# Patient Record
Sex: FEMALE
[De-identification: names, ages, dates, MRNs, and addresses within clinical notes are randomized per-mention and may not be internally consistent; named-entity substitution may affect disease eponyms.]

---

## 2024-04-26 PROBLEM — Z00.00 ENCOUNTER FOR PREVENTIVE HEALTH EXAMINATION: Status: ACTIVE | Noted: 2024-04-26

## 2024-04-30 ENCOUNTER — APPOINTMENT (OUTPATIENT)
Dept: ANTEPARTUM | Facility: CLINIC | Age: 34
End: 2024-04-30
Payer: COMMERCIAL

## 2024-04-30 ENCOUNTER — TRANSCRIPTION ENCOUNTER (OUTPATIENT)
Age: 34
End: 2024-04-30

## 2024-04-30 ENCOUNTER — ASOB RESULT (OUTPATIENT)
Age: 34
End: 2024-04-30

## 2024-04-30 PROCEDURE — 76805 OB US >/= 14 WKS SNGL FETUS: CPT

## 2024-04-30 PROCEDURE — 76817 TRANSVAGINAL US OBSTETRIC: CPT

## 2024-05-28 ENCOUNTER — ASOB RESULT (OUTPATIENT)
Age: 34
End: 2024-05-28

## 2024-05-28 ENCOUNTER — APPOINTMENT (OUTPATIENT)
Dept: ANTEPARTUM | Facility: CLINIC | Age: 34
End: 2024-05-28
Payer: COMMERCIAL

## 2024-05-28 PROCEDURE — 76817 TRANSVAGINAL US OBSTETRIC: CPT

## 2024-05-28 PROCEDURE — 76811 OB US DETAILED SNGL FETUS: CPT

## 2024-06-14 ENCOUNTER — ASOB RESULT (OUTPATIENT)
Age: 34
End: 2024-06-14

## 2024-06-14 ENCOUNTER — APPOINTMENT (OUTPATIENT)
Dept: ANTEPARTUM | Facility: CLINIC | Age: 34
End: 2024-06-14
Payer: COMMERCIAL

## 2024-06-14 PROCEDURE — 76816 OB US FOLLOW-UP PER FETUS: CPT

## 2024-07-26 ENCOUNTER — APPOINTMENT (OUTPATIENT)
Dept: ANTEPARTUM | Facility: CLINIC | Age: 34
End: 2024-07-26
Payer: COMMERCIAL

## 2024-07-26 ENCOUNTER — ASOB RESULT (OUTPATIENT)
Age: 34
End: 2024-07-26

## 2024-07-26 PROCEDURE — 76816 OB US FOLLOW-UP PER FETUS: CPT

## 2024-07-26 PROCEDURE — 76817 TRANSVAGINAL US OBSTETRIC: CPT

## 2024-07-26 PROCEDURE — 76819 FETAL BIOPHYS PROFIL W/O NST: CPT

## 2024-07-26 PROCEDURE — 76820 UMBILICAL ARTERY ECHO: CPT | Mod: 59

## 2024-08-23 ENCOUNTER — APPOINTMENT (OUTPATIENT)
Dept: ANTEPARTUM | Facility: CLINIC | Age: 34
End: 2024-08-23
Payer: COMMERCIAL

## 2024-08-23 ENCOUNTER — ASOB RESULT (OUTPATIENT)
Age: 34
End: 2024-08-23

## 2024-08-23 PROCEDURE — 76819 FETAL BIOPHYS PROFIL W/O NST: CPT | Mod: 59

## 2024-08-23 PROCEDURE — 76816 OB US FOLLOW-UP PER FETUS: CPT

## 2024-08-23 PROCEDURE — 76820 UMBILICAL ARTERY ECHO: CPT | Mod: 59

## 2024-08-23 PROCEDURE — 76817 TRANSVAGINAL US OBSTETRIC: CPT

## 2024-09-30 ENCOUNTER — OUTPATIENT (OUTPATIENT)
Dept: OUTPATIENT SERVICES | Facility: HOSPITAL | Age: 34
LOS: 1 days | End: 2024-09-30
Payer: COMMERCIAL

## 2024-09-30 VITALS
SYSTOLIC BLOOD PRESSURE: 137 MMHG | TEMPERATURE: 98 F | HEART RATE: 114 BPM | RESPIRATION RATE: 18 BRPM | DIASTOLIC BLOOD PRESSURE: 78 MMHG | OXYGEN SATURATION: 97 %

## 2024-09-30 DIAGNOSIS — O26.899 OTHER SPECIFIED PREGNANCY RELATED CONDITIONS, UNSPECIFIED TRIMESTER: ICD-10-CM

## 2024-09-30 PROCEDURE — 99214 OFFICE O/P EST MOD 30 MIN: CPT

## 2024-09-30 NOTE — OB PROVIDER TRIAGE NOTE - HISTORY OF PRESENT ILLNESS
Patient is a 34 y.o.  @39w2d who presents with ctx since 3pm. Pt states the ctx were 5-10 minutes apart earlier however since arriving to triage she feels that they have spaced out. States they are ~3/10 in severity. Denies LOF or VB. Endorses fetal movement. Last VE in the office 2-3 weeks ago, pt was told she was closed.     Spontaneous pregnancy, uncomplicated. NIPT and anatomy scan WNL. Passed GCT. Denies elevated BPs in the pregnancy. EFW [ ].  GBS negative.   [Date]:     Ob hx: Denies  Gyn hx: Denies  PMH: Denies  Meds: PNV  PSH: Denies  Allergies: Denies    Physical Exam  BP  137/78 (24 @ 17:16)    Gen: Well-appearing. NAD.  Resp: Breathing comfortably on RA  Abd: Gravid uterus. Soft, non-tender, non-distended.    SSE:  VE: _    TAUS: _presentation. _placenta. BPP _/8, PALMA _.  TVUS:     FHT: Baseline _, moderate variability, +accels, -decels  [place]: Ctx_    A/P  34yyo G[ ]P[ ] @[ ]w[ ]d presenting [ ].     -     Li Benz, PGY1  Discussed with  Patient is a 34 y.o.  @39w2d who presents with ctx since 3pm. Pt states the ctx were 5-10 minutes apart earlier however since arriving to triage she feels that they have spaced out. States they are ~3/10 in severity. Denies LOF or VB. Endorses fetal movement. Last VE in the office 2-3 weeks ago, pt was told she was closed.     Spontaneous pregnancy, uncomplicated. NIPT and anatomy scan WNL. Passed GCT. Denies elevated BPs in the pregnancy. EFW 3400g.  GBS negative.  BH 24: cephalic presentation, anterior placenta, PALMA 17.0, BPP 8/8, EFW 2329g, 48%ile, AC 50%ile; Hx of Low lying placenta, now resolved; hx of subchorionic hematoma, now resolved    Ob hx:   G1-  , IOL for decreased FM. c/b PPH, received 1U pRBCs. 3175g  Gyn hx: Denies ovarian cysts, fibroids, abn pap, STIs/HSV  PMH: Denies  Meds: PNV  PSH: Denies  Allergies: Denies    Physical Exam  BP  137/78 (24 @ 17:16)    Gen: Well-appearing. NAD.  Resp: Breathing comfortably on RA  Abd: Gravid uterus. Soft, non-tender, non-distended.    VE: 1-2/long  TAUS: cephalic presentation. anterior placenta. BPP 8/8, PALMA 17.8.      FHT: Baseline 120, moderate variability, +accels, -decels; reactive and reassuring  [place]: no discrete ctx on toco; +uterine irritability    A/P  34 y.o.  @39w2d presenting for r/o labor.  -Pt with ctx since 3pm, however now spacing out. No discrete ctx on toco and patient endorsing 1-2 ctx throughout entire triage evaluation. VE 1-2/long. [place]    -     Li Benz, PGY1  Discussed with  Patient is a 34 y.o.  @39w2d who presents with ctx since 3pm. Pt states the ctx were 5-10 minutes apart earlier however since arriving to triage she feels that they have spaced out. States they are ~3/10 in severity. Denies LOF or VB. Endorses fetal movement. Last VE in the office 2-3 weeks ago, pt was told she was closed.     Spontaneous pregnancy, uncomplicated. NIPT and anatomy scan WNL. Passed GCT. Denies elevated BPs in the pregnancy. EFW 3400g.  GBS negative.  BH 24: cephalic presentation, anterior placenta, APLMA 17.0, BPP 8/8, EFW 2329g, 48%ile, AC 50%ile; Hx of Low lying placenta, now resolved; hx of subchorionic hematoma, now resolved    Ob hx:   G1-  , IOL for decreased FM. c/b PPH, received 1U pRBCs. 3175g  Gyn hx: Denies ovarian cysts, fibroids, abn pap, STIs/HSV  PMH: Denies  Meds: PNV  PSH: Denies  Allergies: Denies    Physical Exam  BP  137/78 (24 @ 17:16)    Gen: Well-appearing. NAD.  Resp: Breathing comfortably on RA  Abd: Gravid uterus. Soft, non-tender, non-distended.    VE: 1-2/long  TAUS: cephalic presentation. anterior placenta. BPP 8/8, PALMA 17.8.      FHT: Baseline 120, moderate variability, +accels, -decels; reactive and reassuring  [place]: no discrete ctx on toco; +uterine irritability    A/P  34 y.o.  @39w2d presenting for r/o labor.  -Pt with ctx since 3pm, however now spacing out per patient and no discrete ctx captured on toco. VE 1-2/long. Low concern for labor at this time given the above evaluation.   -Counseled patient on strict return precautions including intensifying ctx, need for pain management, leakage of fluid, vaginal bleeding, or decreased fetal movement. Pt endorses understanding.  -Fetal status reassuring given BPP 8/8 and NST reactive and reassuring.  -Pt has appointment scheduled with outpatient OBGYN for this week  -IOL scheduled for Saturday per patient    Li Benz, PGY1  Discussed with Dr. Oliva and Dr. Liu, PGY3

## 2024-09-30 NOTE — OB RN TRIAGE NOTE - PATIENT'S GENDER IDENTITY
Keep up the good work on diet & water.    Consider 1/2 - 1 dose of miralax - always take a full dose if you are taking an ibuprofen.     For those with a tendency towards constipation, avoid TUMS type antacids, & instead take an antacid with magnesium such as maalox, mylanta, gaviscon - liquid is best.    If you must use chewable be sure to chew it up well to provide for the maximum surface area to absorb the most acid.       Female

## 2024-10-03 DIAGNOSIS — Z3A.39 39 WEEKS GESTATION OF PREGNANCY: ICD-10-CM

## 2024-10-03 DIAGNOSIS — O47.1 FALSE LABOR AT OR AFTER 37 COMPLETED WEEKS OF GESTATION: ICD-10-CM

## 2024-10-05 ENCOUNTER — INPATIENT (INPATIENT)
Facility: HOSPITAL | Age: 34
LOS: 1 days | Discharge: HOME | End: 2024-10-07
Attending: STUDENT IN AN ORGANIZED HEALTH CARE EDUCATION/TRAINING PROGRAM | Admitting: STUDENT IN AN ORGANIZED HEALTH CARE EDUCATION/TRAINING PROGRAM
Payer: COMMERCIAL

## 2024-10-05 VITALS
OXYGEN SATURATION: 100 % | TEMPERATURE: 98 F | HEART RATE: 100 BPM | SYSTOLIC BLOOD PRESSURE: 117 MMHG | HEIGHT: 64 IN | DIASTOLIC BLOOD PRESSURE: 71 MMHG | RESPIRATION RATE: 18 BRPM | WEIGHT: 199.96 LBS

## 2024-10-05 LAB
BASOPHILS # BLD AUTO: 0.07 K/UL — SIGNIFICANT CHANGE UP (ref 0–0.2)
BASOPHILS NFR BLD AUTO: 0.6 % — SIGNIFICANT CHANGE UP (ref 0–2)
BLD GP AB SCN SERPL QL: NEGATIVE — SIGNIFICANT CHANGE UP
EOSINOPHIL # BLD AUTO: 0.12 K/UL — SIGNIFICANT CHANGE UP (ref 0–0.5)
EOSINOPHIL NFR BLD AUTO: 1 % — SIGNIFICANT CHANGE UP (ref 0–6)
HCT VFR BLD CALC: 34.5 % — SIGNIFICANT CHANGE UP (ref 34.5–45)
HGB BLD-MCNC: 11.3 G/DL — LOW (ref 11.5–15.5)
IMM GRANULOCYTES NFR BLD AUTO: 1.4 % — HIGH (ref 0–0.9)
LYMPHOCYTES # BLD AUTO: 2.88 K/UL — SIGNIFICANT CHANGE UP (ref 1–3.3)
LYMPHOCYTES # BLD AUTO: 24 % — SIGNIFICANT CHANGE UP (ref 13–44)
MCHC RBC-ENTMCNC: 29 PG — SIGNIFICANT CHANGE UP (ref 27–34)
MCHC RBC-ENTMCNC: 32.8 GM/DL — SIGNIFICANT CHANGE UP (ref 32–36)
MCV RBC AUTO: 88.7 FL — SIGNIFICANT CHANGE UP (ref 80–100)
MONOCYTES # BLD AUTO: 0.99 K/UL — HIGH (ref 0–0.9)
MONOCYTES NFR BLD AUTO: 8.3 % — SIGNIFICANT CHANGE UP (ref 2–14)
NEUTROPHILS # BLD AUTO: 7.76 K/UL — HIGH (ref 1.8–7.4)
NEUTROPHILS NFR BLD AUTO: 64.7 % — SIGNIFICANT CHANGE UP (ref 43–77)
NRBC # BLD: 0 /100 WBCS — SIGNIFICANT CHANGE UP (ref 0–0)
PLATELET # BLD AUTO: 197 K/UL — SIGNIFICANT CHANGE UP (ref 150–400)
RBC # BLD: 3.89 M/UL — SIGNIFICANT CHANGE UP (ref 3.8–5.2)
RBC # FLD: 14.2 % — SIGNIFICANT CHANGE UP (ref 10.3–14.5)
RH IG SCN BLD-IMP: POSITIVE — SIGNIFICANT CHANGE UP
RH IG SCN BLD-IMP: POSITIVE — SIGNIFICANT CHANGE UP
T PALLIDUM AB TITR SER: NEGATIVE — SIGNIFICANT CHANGE UP
WBC # BLD: 11.99 K/UL — HIGH (ref 3.8–10.5)
WBC # FLD AUTO: 11.99 K/UL — HIGH (ref 3.8–10.5)

## 2024-10-05 RX ORDER — OXYTOCIN/RINGER'S LACTATE 20/500ML
167 PLASTIC BAG, INJECTION (ML) INTRAVENOUS
Qty: 30 | Refills: 0 | Status: DISCONTINUED | OUTPATIENT
Start: 2024-10-05 | End: 2024-10-07

## 2024-10-05 RX ORDER — DIPHENHYDRAMINE HCL 12.5MG/5ML
25 LIQUID (ML) ORAL EVERY 6 HOURS
Refills: 0 | Status: DISCONTINUED | OUTPATIENT
Start: 2024-10-05 | End: 2024-10-07

## 2024-10-05 RX ORDER — CEFAZOLIN SODIUM 1 G
2000 VIAL (EA) INJECTION ONCE
Refills: 0 | Status: COMPLETED | OUTPATIENT
Start: 2024-10-05 | End: 2024-10-05

## 2024-10-05 RX ORDER — OXYTOCIN/RINGER'S LACTATE 20/500ML
PLASTIC BAG, INJECTION (ML) INTRAVENOUS
Qty: 30 | Refills: 0 | Status: DISCONTINUED | OUTPATIENT
Start: 2024-10-05 | End: 2024-10-05

## 2024-10-05 RX ORDER — TETANUS TOXOID, REDUCED DIPHTHERIA TOXOID AND ACELLULAR PERTUSSIS VACCINE, ADSORBED 5; 2.5; 8; 8; 2.5 [IU]/.5ML; [IU]/.5ML; UG/.5ML; UG/.5ML; UG/.5ML
0.5 SUSPENSION INTRAMUSCULAR ONCE
Refills: 0 | Status: DISCONTINUED | OUTPATIENT
Start: 2024-10-05 | End: 2024-10-07

## 2024-10-05 RX ORDER — SODIUM CHLORIDE 0.9 % (FLUSH) 0.9 %
3 SYRINGE (ML) INJECTION EVERY 8 HOURS
Refills: 0 | Status: DISCONTINUED | OUTPATIENT
Start: 2024-10-05 | End: 2024-10-07

## 2024-10-05 RX ORDER — KETOROLAC TROMETHAMINE 10 MG/1
30 TABLET, FILM COATED ORAL ONCE
Refills: 0 | Status: DISCONTINUED | OUTPATIENT
Start: 2024-10-05 | End: 2024-10-05

## 2024-10-05 RX ORDER — HEPARIN SOD,PORK IN 0.45% NACL 5K/1000 ML
250 INTRAVENOUS SOLUTION INTRAVENOUS
Refills: 0 | Status: DISCONTINUED | OUTPATIENT
Start: 2024-10-05 | End: 2024-10-05

## 2024-10-05 RX ORDER — SODIUM CHLORIDE IRRIG SOLUTION 0.9 %
1000 SOLUTION, IRRIGATION IRRIGATION
Refills: 0 | Status: DISCONTINUED | OUTPATIENT
Start: 2024-10-05 | End: 2024-10-05

## 2024-10-05 RX ORDER — MAGNESIUM HYDROXIDE 400 MG/5ML
30 SUSPENSION, ORAL (FINAL DOSE FORM) ORAL
Refills: 0 | Status: DISCONTINUED | OUTPATIENT
Start: 2024-10-05 | End: 2024-10-07

## 2024-10-05 RX ORDER — SOAP/LANOLIN
1 BAR TOPICAL EVERY 4 HOURS
Refills: 0 | Status: DISCONTINUED | OUTPATIENT
Start: 2024-10-05 | End: 2024-10-07

## 2024-10-05 RX ORDER — OXYCODONE HYDROCHLORIDE 30 MG/1
5 TABLET, FILM COATED, EXTENDED RELEASE ORAL
Refills: 0 | Status: DISCONTINUED | OUTPATIENT
Start: 2024-10-05 | End: 2024-10-07

## 2024-10-05 RX ORDER — DIBUCAINE 1 %
1 OINTMENT (GRAM) TOPICAL EVERY 6 HOURS
Refills: 0 | Status: DISCONTINUED | OUTPATIENT
Start: 2024-10-05 | End: 2024-10-07

## 2024-10-05 RX ORDER — OXYCODONE HYDROCHLORIDE 30 MG/1
5 TABLET, FILM COATED, EXTENDED RELEASE ORAL ONCE
Refills: 0 | Status: DISCONTINUED | OUTPATIENT
Start: 2024-10-05 | End: 2024-10-07

## 2024-10-05 RX ORDER — ANTI-ITCH CREAM 1 G/100G
1 OINTMENT TOPICAL EVERY 6 HOURS
Refills: 0 | Status: DISCONTINUED | OUTPATIENT
Start: 2024-10-05 | End: 2024-10-07

## 2024-10-05 RX ORDER — CHLORHEXIDINE GLUCONATE ORAL RINSE 1.2 MG/ML
1 SOLUTION DENTAL DAILY
Refills: 0 | Status: DISCONTINUED | OUTPATIENT
Start: 2024-10-05 | End: 2024-10-05

## 2024-10-05 RX ORDER — PRAMOXINE HYDROCHLORIDE 10 MG/ML
1 LOTION TOPICAL EVERY 4 HOURS
Refills: 0 | Status: DISCONTINUED | OUTPATIENT
Start: 2024-10-05 | End: 2024-10-07

## 2024-10-05 RX ORDER — OXYTOCIN/RINGER'S LACTATE 20/500ML
167 PLASTIC BAG, INJECTION (ML) INTRAVENOUS
Qty: 30 | Refills: 0 | Status: DISCONTINUED | OUTPATIENT
Start: 2024-10-05 | End: 2024-10-05

## 2024-10-05 RX ORDER — SODIUM CITRATE AND CITRIC ACID MONOHYDRATE 334; 500 MG/5ML; MG/5ML
15 SOLUTION ORAL EVERY 6 HOURS
Refills: 0 | Status: DISCONTINUED | OUTPATIENT
Start: 2024-10-05 | End: 2024-10-05

## 2024-10-05 RX ORDER — PRENATAL VIT,CAL 76/IRON/FOLIC 29 MG-1 MG
1 TABLET ORAL DAILY
Refills: 0 | Status: DISCONTINUED | OUTPATIENT
Start: 2024-10-05 | End: 2024-10-07

## 2024-10-05 RX ORDER — ACETAMINOPHEN 325 MG
975 TABLET ORAL
Refills: 0 | Status: DISCONTINUED | OUTPATIENT
Start: 2024-10-05 | End: 2024-10-07

## 2024-10-05 RX ADMIN — Medication 600 MILLIGRAM(S): at 23:44

## 2024-10-05 RX ADMIN — Medication 220 MILLIGRAM(S): at 16:30

## 2024-10-05 RX ADMIN — Medication 2 MILLIUNIT(S)/MIN: at 08:39

## 2024-10-05 RX ADMIN — Medication 100 MILLIGRAM(S): at 17:10

## 2024-10-05 RX ADMIN — KETOROLAC TROMETHAMINE 30 MILLIGRAM(S): 10 TABLET, FILM COATED ORAL at 18:50

## 2024-10-05 RX ADMIN — Medication 975 MILLIGRAM(S): at 21:15

## 2024-10-05 RX ADMIN — Medication 975 MILLIGRAM(S): at 22:00

## 2024-10-05 NOTE — OB PROVIDER LABOR PROGRESS NOTE - NS_SUBJECTIVE/OBJECTIVE_OBGYN_ALL_OB_FT
EFM reviewed. Baseline 120, moderate variability, no accelerations, no decelerations. Category I tracing.   Ctx q2min.   Comfortable with epidural. S/p cervical balloon and arom 1415, clear. Last VE 3-4/80/-3. Will continue to monitor closely.   Dr. Fontaine in house.

## 2024-10-05 NOTE — OB PROVIDER LABOR PROGRESS NOTE - NS_SUBJECTIVE/OBJECTIVE_OBGYN_ALL_OB_FT
EFM reviewed. Baseline 125, moderate variability, +accelerations, no decelerations. Category I tracing.   Ctx q2min.     Pitocin at 6, will continue to increase as tolerated.   Pt to receive epidural shortly.

## 2024-10-05 NOTE — OB PROVIDER DELIVERY SUMMARY - NSLOWPPHRISK_OBGYN_A_OB
No previous uterine incision/[place] Pregnancy/Less than or equal to 4 previous vaginal births/No known bleeding disorder/No history of postpartum hemorrhage/No other PPH risks indicated

## 2024-10-05 NOTE — OB PROVIDER DELIVERY SUMMARY - NSSELHIDDEN_OBGYN_ALL_OB_FT
[NS_DeliveryAttending1_OBGYN_ALL_OB_FT:[id number]=],[NS_DeliveryAssist1_OBGYN_ALL_OB_FT:[id number]=],[NS_DeliveryRN_OBGYN_ALL_OB_FT:[id number]=],[NS_CirculateRN2_OBGYN_ALL_OB_FT:[id number]=]

## 2024-10-05 NOTE — OB PROVIDER LABOR PROGRESS NOTE - NS_SUBJECTIVE/OBJECTIVE_OBGYN_ALL_OB_FT
EFM reviewed. Baseline 130, moderate variability, +accelerations, no decelerations. Category I tracing.   Ctx q3min.     Pitocin at 12mu, will continue to increase as tolerated.   VS reviewed. BPs normotensive. EFM reviewed. Baseline 130, moderate variability, +accelerations, no decelerations. Category I tracing.   Ctx q3min.     Pitocin at 10mu, will continue to increase as tolerated.   VS reviewed. BPs normotensive.

## 2024-10-05 NOTE — PRE-ANESTHESIA EVALUATION ADULT - MALLAMPATI CLASS
Class II - visualization of the soft palate, fauces, and uvula Patient expressed no known problems or needs

## 2024-10-05 NOTE — OB RN DELIVERY SUMMARY - NSSELHIDDEN_OBGYN_ALL_OB_FT
[NS_DeliveryAttending1_OBGYN_ALL_OB_FT:[id number]=],[NS_DeliveryAssist1_OBGYN_ALL_OB_FT:[id number]=],[NS_DeliveryRN_OBGYN_ALL_OB_FT:[id number]=],[NS_CirculateRN2_OBGYN_ALL_OB_FT:[id number]=]

## 2024-10-05 NOTE — OB PROVIDER H&P - NSLOWPPHRISK_OBGYN_A_OB
Unable to offer due to clinical condition
No previous uterine incision/[place] Pregnancy/Less than or equal to 4 previous vaginal births/No known bleeding disorder/No history of postpartum hemorrhage/No other PPH risks indicated

## 2024-10-05 NOTE — OB PROVIDER DELIVERY SUMMARY - NSPROVIDERDELIVERYNOTE_OBGYN_ALL_OB_FT
33 y/o  presented at 40w0 EGA for term induction of labor on 10/5/2024. Labor was induced with pitocin. Patient received an epidural for pain control. Amniotomy was performed with clear fluid. She progressed to fully dilated, pushed effectively, and had a  from MARYCRUZ position. Delayed cord clamping performed. Given protracted third stage of labor, placenta was manually removed and was found to be intact with 3 vessel cord. Cervix was inspected and found intact. A second degree laceration was repaired with 2-0 and 3-0 chromic suture without complications. Excellent hemostasis. . Patient tolerated procedure well. Mother and infant in stable conditions. TXA given prophylactically. Ancef given postpartum for manual removal of placenta. Dr. Fontaine present throughout procedure.

## 2024-10-05 NOTE — OB RN DELIVERY SUMMARY - NSBEGANLABOR_OBGYN_A_OB
While in Labor & Delivery Cibinqo Counseling: I discussed with the patient the risks of Cibinqo therapy including but not limited to common cold, nausea, headache, cold sores, increased blood CPK levels, dizziness, UTIs, fatigue, acne, and vomitting. Live vaccines should be avoided.  This medication has been linked to serious infections; higher rate of mortality; malignancy and lymphoproliferative disorders; major adverse cardiovascular events; thrombosis; thrombocytopenia and lymphopenia; lipid elevations; and retinal detachment.

## 2024-10-05 NOTE — OB RN DELIVERY SUMMARY - NS_SEPSISRSKCALC_OBGYN_ALL_OB_FT
EOS calculated successfully. EOS Risk Factor: 0.5/1000 live births ([facility] national incidence); GA=40w;Temp=98; ROM=2.317; GBS='Negative'; Antibiotics='No antibiotics or any antibiotics < 2 hrs prior to birth'

## 2024-10-06 RX ADMIN — Medication 975 MILLIGRAM(S): at 14:28

## 2024-10-06 RX ADMIN — Medication 600 MILLIGRAM(S): at 23:15

## 2024-10-06 RX ADMIN — Medication 3 MILLILITER(S): at 14:46

## 2024-10-06 RX ADMIN — Medication 975 MILLIGRAM(S): at 21:22

## 2024-10-06 RX ADMIN — Medication 600 MILLIGRAM(S): at 12:00

## 2024-10-06 RX ADMIN — Medication 600 MILLIGRAM(S): at 17:14

## 2024-10-06 RX ADMIN — Medication 975 MILLIGRAM(S): at 21:43

## 2024-10-06 RX ADMIN — Medication 975 MILLIGRAM(S): at 02:38

## 2024-10-06 RX ADMIN — Medication 975 MILLIGRAM(S): at 15:00

## 2024-10-06 RX ADMIN — Medication 600 MILLIGRAM(S): at 11:25

## 2024-10-06 RX ADMIN — Medication 600 MILLIGRAM(S): at 06:34

## 2024-10-06 RX ADMIN — Medication 975 MILLIGRAM(S): at 09:05

## 2024-10-06 RX ADMIN — Medication 600 MILLIGRAM(S): at 17:45

## 2024-10-06 RX ADMIN — Medication 975 MILLIGRAM(S): at 08:33

## 2024-10-06 RX ADMIN — Medication 975 MILLIGRAM(S): at 03:00

## 2024-10-06 RX ADMIN — Medication 1 TABLET(S): at 11:46

## 2024-10-06 RX ADMIN — Medication 600 MILLIGRAM(S): at 06:15

## 2024-10-06 RX ADMIN — Medication 600 MILLIGRAM(S): at 00:00

## 2024-10-06 NOTE — LACTATION INITIAL EVALUATION - NS LACT CON REASON FOR REQ
Poppy Thapa is a 34 year old  parent of a 40 week infant. LC to bedside to discuss progress of breastfeeding and provide support on PPD0. Patient states that she plans to breastfeed. She denies any chronic medical conditions or history of breast/chest surgery. She reports a challenging experience breastfeeding her first child, who went to the NICU, and says she ended up exclusively pumping. On assessment, breasts are round, soft, symmetric. Nipples intact and everted, ++colostrum. At the time of consultation, baby immediately s/p circumcision. LC assisted with hand placement in the football position. Baby latched with wide gape and flanged lips, but gagged and unlatched despite several attempts and burps. He was subsequently placed STS with Poppy and LC demonstrated hand expression/finger feeding of colostrum. PLAN: Provide as much skin to skin as safely able, continue nursing on demand at least Q2-3h, hand expression, monitor diaper output, follow up with pediatrician, outpatient IBCLC PRN.   EDUCATION:  *Normal  feeding/behavior patterns   *Infant feeding cues and strategies to feed a sleepy baby   *Stimulate breasts & feed baby Q2-3h, keys to an adequate supply   *Properties of a good latch and positioning to achieve a deep and effective latch  *Breastfeeding positions   *Signs of milk transfer at the breast   *Benefits/methods for hand expression   *Signs of satiety & adequate  intake/output such as wet/dirty diapers and weight.   Patient verbalized understanding of education and how/when to follow up. All questions answered at this time, and LC available via RN.

## 2024-10-06 NOTE — LACTATION INITIAL EVALUATION - LACTATION INTERVENTIONS
initiate/review safe skin-to-skin/initiate/review hand expression/initiate/review techniques for position and latch/post discharge community resources provided/initiate/review breast massage/compression/reviewed components of an effective feeding and at least 8 effective feedings per day required/reviewed importance of monitoring infant diapers, the breastfeeding log, and minimum output each day/reviewed feeding on demand/by cue at least 8 times a day/recommended follow-up with pediatrician within 24 hours of discharge

## 2024-10-06 NOTE — PROGRESS NOTE ADULT - SUBJECTIVE AND OBJECTIVE BOX
Patient evaluated at bedside this morning, resting comfortable in bed, no acute events overnight.  She reports pain is well controlled with tylenol and motrin.  She denies headache, dizziness, chest pain, palpitations, shortness of breath, nausea, vomiting, heavy vaginal bleeding or perineal discomfort. Reports decrease in amount of vaginal bleeding and denies clots.  She has been ambulating without assistance, voiding spontaneously.  Tolerating food well, without nausea/vomit.      Physical Exam:  T(C): 36.6 (10-06-24 @ 06:00), Max: 36.8 (10-05-24 @ 21:49)  HR: 80 (10-06-24 @ 06:00) (73 - 89)  BP: 97/67 (10-06-24 @ 06:00) (97/64 - 103/68)  RR: 17 (10-06-24 @ 06:00) (17 - 18)  SpO2: 96% (10-06-24 @ 06:00) (96% - 97%)    GA: NAD, comfortable, conversational  Abd: soft, nontender, nondistended, no rebound or guarding, uterus firm.  Extremities: no swelling or calf tenderness  Perineum: lochia wnl                          11.3   11.99 )-----------( 197      ( 05 Oct 2024 08:21 )             34.5           acetaminophen     Tablet .. 975 milliGRAM(s) Oral <User Schedule>  benzocaine 20%/menthol 0.5% Spray 1 Spray(s) Topical every 6 hours PRN  dibucaine 1% Ointment 1 Application(s) Topical every 6 hours PRN  diphenhydrAMINE 25 milliGRAM(s) Oral every 6 hours PRN  diphtheria/tetanus/pertussis (acellular) Vaccine (Adacel) 0.5 milliLiter(s) IntraMuscular once  hydrocortisone 1% Cream 1 Application(s) Topical every 6 hours PRN  ibuprofen  Tablet. 600 milliGRAM(s) Oral every 6 hours  lanolin Ointment 1 Application(s) Topical every 6 hours PRN  magnesium hydroxide Suspension 30 milliLiter(s) Oral two times a day PRN  oxyCODONE    IR 5 milliGRAM(s) Oral every 3 hours PRN  oxyCODONE    IR 5 milliGRAM(s) Oral once PRN  oxytocin Infusion 167 milliUNIT(s)/Min IV Continuous <Continuous>  pramoxine 1%/zinc 5% Cream 1 Application(s) Topical every 4 hours PRN  prenatal multivitamin 1 Tablet(s) Oral daily  simethicone 80 milliGRAM(s) Chew every 4 hours PRN  sodium chloride 0.9% lock flush 3 milliLiter(s) IV Push every 8 hours  witch hazel Pads 1 Application(s) Topical every 4 hours PRN

## 2024-10-07 ENCOUNTER — TRANSCRIPTION ENCOUNTER (OUTPATIENT)
Age: 34
End: 2024-10-07

## 2024-10-07 VITALS
OXYGEN SATURATION: 96 % | SYSTOLIC BLOOD PRESSURE: 93 MMHG | DIASTOLIC BLOOD PRESSURE: 65 MMHG | RESPIRATION RATE: 18 BRPM | HEART RATE: 80 BPM | TEMPERATURE: 98 F

## 2024-10-07 PROCEDURE — 86901 BLOOD TYPING SEROLOGIC RH(D): CPT

## 2024-10-07 PROCEDURE — 86780 TREPONEMA PALLIDUM: CPT

## 2024-10-07 PROCEDURE — 86900 BLOOD TYPING SEROLOGIC ABO: CPT

## 2024-10-07 PROCEDURE — 85025 COMPLETE CBC W/AUTO DIFF WBC: CPT

## 2024-10-07 PROCEDURE — 36415 COLL VENOUS BLD VENIPUNCTURE: CPT

## 2024-10-07 PROCEDURE — 86850 RBC ANTIBODY SCREEN: CPT

## 2024-10-07 PROCEDURE — 59050 FETAL MONITOR W/REPORT: CPT

## 2024-10-07 RX ORDER — ACETAMINOPHEN 325 MG
3 TABLET ORAL
Qty: 0 | Refills: 0 | DISCHARGE
Start: 2024-10-07

## 2024-10-07 RX ADMIN — Medication 600 MILLIGRAM(S): at 12:28

## 2024-10-07 RX ADMIN — Medication 600 MILLIGRAM(S): at 06:21

## 2024-10-07 RX ADMIN — Medication 600 MILLIGRAM(S): at 00:00

## 2024-10-07 RX ADMIN — Medication 975 MILLIGRAM(S): at 03:33

## 2024-10-07 RX ADMIN — Medication 975 MILLIGRAM(S): at 03:23

## 2024-10-07 RX ADMIN — Medication 1 TABLET(S): at 12:28

## 2024-10-07 RX ADMIN — Medication 975 MILLIGRAM(S): at 10:05

## 2024-10-07 RX ADMIN — Medication 600 MILLIGRAM(S): at 05:55

## 2024-10-07 NOTE — DISCHARGE NOTE OB - CARE PROVIDER_API CALL
Eduard Oliva  Obstetrics and Gynecology  [facility] [address] [place] [id number]  Phone: [phone]  Fax: [phone]  Follow Up Time: 2 months

## 2024-10-07 NOTE — DISCHARGE NOTE OB - NS MD DC FALL RISK RISK
For information on Fall & Injury Prevention, visit: [email or web address] OR  [email or web address] OR  https://www.cdc.gov/steadi/patient.html

## 2024-10-07 NOTE — PROGRESS NOTE ADULT - SUBJECTIVE AND OBJECTIVE BOX
Patient evaluated at bedside this morning, resting comfortable in bed, no acute events overnight. Vital signs stable overnight.  She reports pain is well controlled with tylenol and motrin.  She denies  chest pain, shortness of breath, nausea, vomiting, heavy vaginal bleeding. She has been ambulating without assistance, voiding spontaneously.  Tolerating food well, without nausea/vomit.      Physical Exam:  T(C): --  HR: --  BP: --  RR: --  SpO2: --    GA: NAD, A&O x 3  Pulm: no increased work of breathing  Abd: soft, nontender, nondistended, no rebound or guarding, uterus firm.  Extremities: no swelling or calf tenderness                          11.3   11.99 )-----------( 197      ( 05 Oct 2024 08:21 )             34.5           acetaminophen     Tablet .. 975 milliGRAM(s) Oral <User Schedule>  benzocaine 20%/menthol 0.5% Spray 1 Spray(s) Topical every 6 hours PRN  dibucaine 1% Ointment 1 Application(s) Topical every 6 hours PRN  diphenhydrAMINE 25 milliGRAM(s) Oral every 6 hours PRN  diphtheria/tetanus/pertussis (acellular) Vaccine (Adacel) 0.5 milliLiter(s) IntraMuscular once  hydrocortisone 1% Cream 1 Application(s) Topical every 6 hours PRN  ibuprofen  Tablet. 600 milliGRAM(s) Oral every 6 hours  lanolin Ointment 1 Application(s) Topical every 6 hours PRN  magnesium hydroxide Suspension 30 milliLiter(s) Oral two times a day PRN  oxyCODONE    IR 5 milliGRAM(s) Oral once PRN  oxyCODONE    IR 5 milliGRAM(s) Oral every 3 hours PRN  oxytocin Infusion 167 milliUNIT(s)/Min IV Continuous <Continuous>  pramoxine 1%/zinc 5% Cream 1 Application(s) Topical every 4 hours PRN  prenatal multivitamin 1 Tablet(s) Oral daily  simethicone 80 milliGRAM(s) Chew every 4 hours PRN  sodium chloride 0.9% lock flush 3 milliLiter(s) IV Push every 8 hours  witch hazel Pads 1 Application(s) Topical every 4 hours PRN       HR: 85  BP: 90/62  RR: 17  SpO2: 96     ==  A/P   Pt s/p , PPD#2 , stable, meeting postpartum milestones   #GHTN  - VSS  #PPC  - Pain: well controlled on tylenol/motrin  - GI: Tolerating regular diet  - : urinating without difficulty/pain  - DVT prophylaxis: ambulating frequently  - Dispo: PPD 2, unless otherwise specified

## 2024-10-15 DIAGNOSIS — Z34.83 ENCOUNTER FOR SUPERVISION OF OTHER NORMAL PREGNANCY, THIRD TRIMESTER: ICD-10-CM

## 2024-10-15 DIAGNOSIS — Z28.09 IMMUNIZATION NOT CARRIED OUT BECAUSE OF OTHER CONTRAINDICATION: ICD-10-CM

## 2024-10-15 DIAGNOSIS — Z3A.40 40 WEEKS GESTATION OF PREGNANCY: ICD-10-CM
